# Patient Record
(demographics unavailable — no encounter records)

---

## 2017-01-05 NOTE — ER DOCUMENT REPORT
ED Flu Like





- General


Mode of Arrival: Ambulatory


Information source: Patient


TRAVEL OUTSIDE OF THE [place] IN LAST 30 DAYS: No





- HPI


Onset: Other - x3 days


Timing/Duration: Persistent


Quality of pain: Achy


Severity: None


Associated symptoms: Other - see narrative





<ELVIS YIN - Last Filed: 01/06/17 04:05>





<EVELINAJAQUAN ANN - Last Filed: 01/06/17 05:39>





- General


Chief Complaint: Headache


Stated Complaint: HEADACHE,BLURRED VISION,FEVER,COUGH


Notes: 


Patient is a 30 year old female that presents to the emergency department today 

with complaints of fevers, cough, congestion, a headache, and generalized body 

aches for three days. Patient states that she was given azithromycin for a 

"bacterial infection" but she is not sure where the infection is located. 

Patient states that most of her family has been sick and she was the "last one 

to get it". Patient states that her children all have ear infections. Patient 

dates she has been having the above mentioned symptoms for three days. (ELVIS YIN)





- Related Data


Allergies/Adverse Reactions: 


 





No Known Allergies Allergy (Verified 01/06/17 05:04)


 








Home Medications: 


 Current Home Medications





Fluoxetine HCl [Prozac 20 mg Capsule] 20 mg PO DAILY 01/06/17 [History]


Oxycodone HCl/Acetaminophen [Percocet 5-325 mg Tablet] 1 - 2 tab PO ASDIR PRN 01 /06/17 [History]











Past Medical History





- General


Information source: Patient





- Social History


Smoking Status: Current Every Day Smoker


Cigarette use (# per day): Yes


Frequency of alcohol use: None


Drug Abuse: None


Lives with: Family


Family History: Reviewed & Not Pertinent


Patient has suicidal ideation: No


Patient has homicidal ideation: No


Psychiatric Medical History: Reports: Hx Anxiety, Hx Depression


Surgical Hx: Negative





- Immunizations


Hx Diphtheria, Pertussis, Tetanus Vaccination: Yes





<ELVIS YIN - Last Filed: 01/06/17 04:05>





Review of Systems





- Review of Systems


Constitutional: See HPI, Fever


EENT: See HPI, Nose congestion


Cardiovascular: No symptoms reported


Respiratory: See HPI, Cough


Gastrointestinal: No symptoms reported


Genitourinary: No symptoms reported


Female Genitourinary: No symptoms reported


Musculoskeletal: No symptoms reported


Skin: No symptoms reported


Hematologic/Lymphatic: No symptoms reported


Neurological/Psychological: See HPI, Headaches


-: Yes All other systems reviewed and negative





<ELVIS YIN - Last Filed: 01/06/17 04:05>





Physical Exam





- Vital signs


Interpretation: Hypotensive, Febrile





<ELVIS YIN - Last Filed: 01/06/17 04:05>





<JAQUAN HOYT - Last Filed: 01/06/17 05:39>





- Vital signs


Vitals: 


 











Temp Pulse Resp BP Pulse Ox


 


 102.1 F H  89   20   105/61   96 


 


 01/05/17 20:02  01/05/17 20:02  01/05/17 20:02  01/05/17 20:02  01/05/17 20:02








 (ELVIS YIN)


 (JAQUAN HOYT)





- Notes


Notes: 


Physical Exam:





General: Alert, ill-appearing, appears uncomfortable. Febrile. 





HEENT: Normocephalic. Atraumatic. PERRL. Extraocular movements intact. 

Oropharynx clear.





Neck: No nuchal rigidity.  Complains of neck pain with head movement. 





Respiratory: No respiratory distress. Clear and equal breath sounds bilaterally.





Cardiovascular: Regular rate and rhythm. 





Abdominal: Normal Inspection. Non-tender. No distension. Normal Bowel Sounds. 





Back: Non-tender. No deformity or step off.





Extremities: Moves all four extremities.


Upper extremities: Normal inspection. Non-tender. Normal color. Normal ROM. 

Normal temperature. 


Lower extremities: Normal inspection. Non-tender. No edema. Normal color. 

Normal ROM. Normal temperature. 





Neurological: Normal cognition. AAOx4. Normal speech.  





Psychological: Normal affect. Normal Mood. 





Skin: Warm. Dry. Normal color.  (ELVIS YIN)





Course





- Laboratory


Result Diagrams: 


 01/05/17 21:00





 01/05/17 21:00





<ELVIS YIN - Last Filed: 01/06/17 04:05>





- Laboratory


Result Diagrams: 


 01/05/17 21:00





 01/05/17 21:00





- Diagnostic Test


Radiology reviewed: Reports reviewed





<JAQUAN HOYT - Last Filed: 01/06/17 05:39>





- Re-evaluation


Re-evalutation: 





01/06/17


Patient is a 30-year-old female who comes in with fever, headache, neck pain, 

body wide pain.  Labs with no acute findings.  No flu.  No strep.  No UTI.  

Chest x-ray and CT negative.  Attempted to perform lumbar puncture and got up 

to the point of anesthetizing the patient but she said that she was nauseated 

and could not tolerate the procedure.  Patient did not want to proceed.  Patient

's symptoms are very concerning for meningitis.  Dexamethasone, cefepime and 

vancomycin have been started.  Patient will be admitted to the hospital.  

Understands and agrees with this plan.  Blood cultures have been sent. (JAQUAN HOYT)





- Vital Signs


Vital signs: 


 











Temp Pulse Resp BP Pulse Ox


 


 98.6 F   89   14   90/60 L  95 


 


 01/06/17 05:32  01/05/17 20:02  01/06/17 05:31  01/06/17 05:31  01/06/17 05:31








 (ELVIS YIN)


 (JAQUAN HOYT)





- Laboratory


Laboratory results interpreted by me: 


 











  01/05/17 01/05/17 01/05/17





  21:00 21:00 21:00


 


WBC  13.0 H  


 


Seg Neutrophils %  80.6 H  


 


Lymphocytes %  7.7 L  


 


Absolute Neutrophils  10.5 H  


 


Absolute Monocytes  1.5 H  


 


Potassium   3.2 L 


 


Chloride   95 L 


 


BUN   4 L 


 


Lactic Acid    0.6 L


 


Urine Blood   














  01/05/17





  21:00


 


WBC 


 


Seg Neutrophils % 


 


Lymphocytes % 


 


Absolute Neutrophils 


 


Absolute Monocytes 


 


Potassium 


 


Chloride 


 


BUN 


 


Lactic Acid 


 


Urine Blood  SMALL H








 (ELVIS YIN)


 (JAQUAN HOYT)





Procedures





- Lumbar Puncture


  ** Lumbar puncture


Consent obtained: Yes


Lumbar puncture pre-procedure: Sterile PPE donned, Betadine prep applied, 

Chloraprep applied, Sterile drapes applied


Patient position: Sitting


Anesthetic type: 1% Lidocaine


Complications: Yes - patient did not tolerate, wanted to stop before spinal 

needle





<JAQUAN HOYT - Last Filed: 01/06/17 05:39>





Critical Care Note





- Critical Care Note


Total time excluding time spent on procedures (mins): 35 - evaluation and 

management of patient with fever and headache, multiple re-evaluations, 

coordination of admission, counseling of patient





<JAQUAN HOYT - Last Filed: 01/06/17 05:39>





Discharge





<ELVIS YIN - Last Filed: 01/06/17 04:05>





- Discharge


Admitting Provider: Hospitalist - Shad


Unit Admitted: Telemetry





<JAQUAN HOYT - Last Filed: 01/06/17 05:39>





- Discharge


Clinical Impression: 


 Meningitis





Headache


Qualifiers:


 Headache type: unspecified Headache chronicity pattern: acute headache 

Intractability: intractable Qualified Code(s): R51 - Headache





Fever


Qualifiers:


 Fever type: unspecified Qualified Code(s): R50.9 - Fever, unspecified





Condition: Stable


Disposition: ADMITTED AS INPATIENT


Scribe Attestation: 





01/06/17 05:39


I personally performed the services described in the documentation, reviewed 

and edited the documentation which was dictated to the scribe in my presence, 

and it accurately records my words and actions. (JAQUAN HOYT)





Scribe Documentation





- Scribe


Written by Scribe:: Ellen Evans, [id number] 1/6/2017


acting as scribe for :: Evelina





<ELVIS YIN - Last Filed: 01/06/17 04:05>

## 2017-01-05 NOTE — ER DOCUMENT REPORT
ED Medical Screen (RME)





- General


Chief Complaint: Headache


Stated Complaint: HEADACHE,BLURRED VISION,FEVER,COUGH


Time seen by provider: 20:51


Mode of Arrival: Ambulatory


Information source: Patient


Notes: 


31 yo female presents to ed for fever cough headache blurred vision left flank 

pain for 3 days.. Patient states she saw Dr Recinos today and he started her on 

azithromycin today.


TRAVEL OUTSIDE OF THE [place] IN LAST 30 DAYS: No





- HPI


Onset: Other - 3 days


Onset/Duration: Gradual, Worse


Quality of pain: Sharp


Severity: Moderate


Pain Level: 4


Associated Symptoms: Abdominal pain, Body/muscle aches, Cough (nonproductive), 

Dizzy/lightheaded, Fever, Headache, Other - left flank pian


Exacerbated by: Denies


Relieved by: Denies


Similar symptoms previously: Yes


Recently seen / treated by doctor: Yes





- Related Data


Smoking: Cigarettes, Less than 1 pack/day


Frequency of alcohol use: None


Drug Abuse: None


Allergies/Adverse Reactions: 


 





No Known Allergies Allergy (Verified 02/12/16 13:15)


 











Past Medical History





- Social History


Frequency of alcohol use: None


Drug Abuse: None





- Past Medical History


Cardiac Medical History: 


   Denies: Hx Coronary Artery Disease, Hx Heart Attack, Hx Hypertension


Pulmonary Medical History: 


   Denies: Hx Asthma, Hx Bronchitis, Hx COPD, Hx Pneumonia


Neurological Medical History: Denies: Hx Cerebrovascular Accident, Hx Seizures


Musculoskeltal Medical History: Denies Hx Arthritis


Psychiatric Medical History: Reports: Hx Anxiety, Hx Depression





- Immunizations


Hx Diphtheria, Pertussis, Tetanus Vaccination: Yes





Physical Exam





- Vital signs


Vitals: 


 











Temp Pulse Resp BP Pulse Ox


 


 102.1 F H  89   20   105/61   96 


 


 01/05/17 20:02  01/05/17 20:02  01/05/17 20:02  01/05/17 20:02  01/05/17 20:02














Course





- Vital Signs


Vital signs: 


 











Temp Pulse Resp BP Pulse Ox


 


 102.1 F H  89   20   105/61   96 


 


 01/05/17 20:02  01/05/17 20:02  01/05/17 20:02  01/05/17 20:02  01/05/17 20:02

## 2017-01-06 NOTE — PDOC H&P
History of Present Illness


Admission Date/PCP: 


  01/06/17 04:20





  ALEXANDREA LEWIS MD





Patient complains of: Fever headache photophobia


History of Present Illness: 


MARISELA CARLISLE is a 30 year old female with a past medical history 

ofDepression  Patient denies homicidal or suicidal ideation.  I Will evaluate 

education reconciliation, TSH and obtain urine drug screen, consider SSRI and 

or mental health consultation.  Diarrhea predominant irritable bowel syndrome 

and pelvic congestion syndrome who had been her usual state of health until 2 

days ago developing headache


Stiffness fever and photophobia.  She denies sore throat, rhinorrhea, seizure 

or dizziness or ear pain.  She admits exposure to daughter with viral syndrome.

   In the emergency room she's found to have a fever of 102 and leukocytosis 

but refuses LP.  She started on empiric antibiotics and referred to the 

hospitalist for meningitis.








Past Medical History


Cardiac Medical History: 


   Denies: Coronary Artery Disease, Myocardial Infarction, Hypertension


Pulmonary Medical History: 


   Denies: Asthma, Bronchitis, Chronic Obstructive Pulmonary Disease (COPD), 

Pneumonia


Neurological Medical History: 


   Denies: Seizures


GI Medical History: Reports: Other - Irritable bowel syndrome diarrhea 

predominant


Musculoskeltal Medical History: 


   Denies: Arthritis


Psychiatric Medical History: Reports: Depression, Tobacco Dependency, Other - 

Chronic pain of pelvic congestion syndrome


Hematology: 


   Denies: Anemia





Social History


Information Source: Patient


Lives with: Family


Smoking Status: Current Every Day Smoker


Cigarettes Packs Per Day: 1


Number of Years Smoking: 10


Frequency of Alcohol Use: None


Hx Recreational Drug Use: No


Hx Prescription Drug Abuse: No





- Advance Directive


Resuscitation Status: Full Code





Family History


Family History: DM


Parental Family History Reviewed: Yes


Children Family History Reviewed: Yes


Sibling(s) Family History Reviewed.: Yes





Medication/Allergy


Home Medications: 








Fluoxetine HCl [Prozac 20 mg Capsule] 20 mg PO DAILY 01/06/17 


Oxycodone HCl/Acetaminophen [Percocet 5-325 mg Tablet] 1 - 2 tab PO ASDIR PRN 01 /06/17 








Allergies/Adverse Reactions: 


 





No Known Allergies Allergy (Verified 01/06/17 05:04)


 











Review of Systems


Constitutional: ABSENT: chills, fever(s), headache(s), weight gain, weight loss


Eyes: ABSENT: visual disturbances


Ears: ABSENT: hearing changes


Cardiovascular: ABSENT: chest pain, dyspnea on exertion, edema, orthropnea, 

palpitations


Respiratory: ABSENT: cough, hemoptysis


Gastrointestinal: ABSENT: abdominal pain, constipation, diarrhea, hematemesis, 

hematochezia, nausea, vomiting


Genitourinary: ABSENT: dysuria, hematuria


Musculoskeletal: ABSENT: joint swelling


Integumentary: ABSENT: rash, wounds


Neurological: ABSENT: abnormal gait, abnormal speech, confusion, dizziness, 

focal weakness, syncope


Psychiatric: ABSENT: anxiety, depression, homidical ideation, suicidal ideation


Endocrine: ABSENT: cold intolerance, heat intolerance, polydipsia, polyuria


Hematologic/Lymphatic: ABSENT: easy bleeding, easy bruising





Physical Exam


Vital Signs: 


 











Temp Pulse Resp BP Pulse Ox


 


 98.9 F   55 L  16   90/46 L  97 


 


 01/06/17 06:06  01/06/17 06:06  01/06/17 06:06  01/06/17 06:06  01/06/17 06:06








 Intake & Output











 01/04/17 01/05/17 01/06/17





 11:59 11:59 11:59


 


Weight   48.9 kg











General appearance: PRESENT: no acute distress, well-developed, well-nourished


Head exam: PRESENT: atraumatic, normocephalic


Eye exam: PRESENT: conjunctiva pink, EOMI, PERRLA.  ABSENT: scleral icterus


Ear exam: PRESENT: normal external ear exam


Mouth exam: PRESENT: moist, tongue midline


Throat exam: ABSENT: post pharyngeal erythema, tonsillar erythema, tonsillar 

exudate, tonsillogmegaly


Neck exam: PRESENT: full ROM, meningismus.  ABSENT: carotid bruit, JVD, 

lymphadenopathy, thyromegaly


Respiratory exam: PRESENT: clear to auscultation jeanine.  ABSENT: rales, rhonchi, 

wheezes


Cardiovascular exam: PRESENT: RRR.  ABSENT: diastolic murmur, rubs, systolic 

murmur


Pulses: PRESENT: normal dorsalis pedis pul


Vascular exam: PRESENT: normal capillary refill


GI/Abdominal exam: PRESENT: normal bowel sounds, soft.  ABSENT: distended, 

guarding, mass, organolmegaly, rebound, tenderness


Rectal exam: PRESENT: deferred


Extremities exam: PRESENT: full ROM.  ABSENT: calf tenderness, clubbing, pedal 

edema


Neurological exam: PRESENT: alert, awake, oriented to person, oriented to place

, oriented to time, oriented to situation, CN II-XII grossly intact.  ABSENT: 

motor sensory deficit


Psychiatric exam: PRESENT: appropriate affect, normal mood.  ABSENT: homicidal 

ideation, suicidal ideation


Skin exam: PRESENT: dry, intact, warm.  ABSENT: cyanosis, rash





Results


Impressions: 


 





Chest X-Ray  01/05/17 20:50


IMPRESSION:  REACTIVE AIRWAY DISEASE VERSUS VIRAL SYNDROME.  NO CONSOLIDATION.


 








Head CT  01/06/17 01:32


IMPRESSION:  NORMAL BRAIN CT WITHOUT CONTRAST.


 














Assessment & Plan





- Diagnosis


(1) Meningitis


Is this a current diagnosis for this admission?: YesPlan: 


Symptoms suggestive of aseptic meningitis she started on Rocephin vancomycin 

and acyclovir continue symptomatic management.  Patient refusing LP continue to 

monitor biophysical and biochemical response








(2) Depression





Is this a current diagnosis for this admission?: YesPlan: 


Patient has flat affect unclear if underlying depression versus acute illness 

continue outpatient SSRI








(3) Chronic pain





Is this a current diagnosis for this admission?: YesPlan: 


Unclear indication for narcotics given history of pelvic congestion syndrome 

will hold narcotics








(4) Tobacco dependence


Is this a current diagnosis for this admission?: YesPlan: 


Tobacco Dependence  patient received tobacco cessation counseling and offered 

nicotine replacement options








(5) Fever


Qualifiers: 


     Fever type: unspecified     Qualified Code(s): R50.9 - Fever, unspecified  


Is this a current diagnosis for this admission?: YesPlan: 


Please see #1








(6) Headache


Qualifiers: 


     Headache type: unspecified     Headache chronicity pattern: acute headache

     Intractability: intractable     Qualified Code(s): R51 - Headache  


Is this a current diagnosis for this admission?: YesPlan: 


Please see #1











- Time


Time Spent: 30 to 50 Minutes

## 2017-01-07 NOTE — PDOC DISCHARGE SUMMARY
General





- Admit/Disc Date/PCP


Admission Date/Primary Care Provider: 


  01/06/17 04:20





  ALEXANDREA LEWIS MD





Discharge Date: 01/07/17





- Discharge Diagnosis


(1) Influenza B


Is this a current diagnosis for this admission?: YesSummary: 


Started on Tamiflu, ibuprofen and prn zofran. She was instructed to use Mucinex 

or Robitussin for cough over the counter








(2) Chronic pain


Is this a current diagnosis for this admission?: YesSummary: 


Continue home pain medication








(3) Depression


Is this a current diagnosis for this admission?: YesSummary: 


continue SSRI








(4) Tobacco dependence


Is this a current diagnosis for this admission?: YesSummary: 


Counseled








(5) Headache


Is this a current diagnosis for this admission?: YesSummary: 


Ibuprofen prn











- Additional Information


Resuscitation Status: Full Code


Discharge Activity: Activity As Tolerated, Balance Activity w/Rest


Home Medications: 








Fluoxetine HCl [Prozac 20 mg Capsule] 20 mg PO DAILY 01/06/17 


Oxycodone HCl/Acetaminophen [Percocet 5-325 mg Tablet] 1 tab PO Q8 01/06/17 


Acetaminophen [Tylenol 325 mg Tablet] 650 mg PO Q4HP PRN  tablet 01/07/17 


Ibuprofen 800 mg PO Q8HP PRN #30 tablet 01/07/17 


Ondansetron HCl [Zofran 4 mg Tablet] 1 - 2 tab PO Q4H PRN #10 tablet 01/07/17 


Oseltamivir Phosphate [Tamiflu 75 mg Capsule] 75 mg PO BID #9 capsule 01/07/17 











History of Present Illness


Patient complains of: Headache, body aches, fever and cough


History of Present Illness: 


MARISELA CARLISLE is a 30 year old female who presented to [facility] ED's 

on 1/5/2016 with fever, cough and headache. She had a rapid flu in the ED that 

was read as negative. She has mild leucocytosis. She refused lumbar puncture by 

ED MD. She was empirically started on IV Ceftriazone, Vancomycin and Acyclovir 

for possible meningitis and referred to the hospitalist for admission. 








Hospital Course


Hospital Course: 


Patient was admitted to the telemetry unit. Fever improved, she continued to 

complain about headache, body ache and cough. We repeated her influenza test 

using a nasal swab and found the patient to be positive for influenza B. 

Antibiotics were stopped. The patient was given one dose of tamiflu and 

discharged home.





Physical Exam


Vital Signs: 


 











Temp Pulse Resp BP Pulse Ox


 


 98.0 F   49 L  16   95/51 L  100 


 


 01/07/17 08:29  01/07/17 08:29  01/07/17 08:29  01/07/17 08:29  01/07/17 08:29








 Intake & Output











 01/06/17 01/07/17 01/08/17





 06:59 06:59 06:59


 


Intake Total  780 


 


Balance  780 


 


Weight 48.9 kg 48.9 kg 











General appearance: PRESENT: no acute distress, thin, well-developed


Head exam: PRESENT: atraumatic, normocephalic


Eye exam: PRESENT: conjunctiva pink, EOMI, PERRLA.  ABSENT: scleral icterus


Ear exam: PRESENT: normal external ear exam


Mouth exam: PRESENT: moist, tongue midline


Neck exam: ABSENT: carotid bruit, JVD, lymphadenopathy, thyromegaly


Respiratory exam: PRESENT: clear to auscultation jeanine.  ABSENT: rales, rhonchi, 

wheezes


Cardiovascular exam: PRESENT: RRR.  ABSENT: diastolic murmur, rubs, systolic 

murmur


Pulses: ABSENT: normal carotid pulses, normal radial pulses, normal femoral 

pulses, normal dorsalis pedis pul, +1 pedal pulses bilateral, +2 pedal pulses 

bilateral, other


Vascular exam: PRESENT: normal capillary refill


GI/Abdominal exam: PRESENT: normal bowel sounds, soft.  ABSENT: distended, 

guarding, mass, organolmegaly, rebound, tenderness


Rectal exam: PRESENT: deferred


Extremities exam: PRESENT: full ROM.  ABSENT: calf tenderness, clubbing, pedal 

edema


Neurological exam: PRESENT: alert, awake, oriented to person, oriented to place

, oriented to time, oriented to situation, CN II-XII grossly intact.  ABSENT: 

motor sensory deficit


Psychiatric exam: PRESENT: appropriate affect, normal mood.  ABSENT: homicidal 

ideation, suicidal ideation


Skin exam: PRESENT: dry, intact, warm.  ABSENT: cyanosis, rash





Results


Laboratory Results: 


 





 01/07/17 05:38 





 01/07/17 05:38 





 











  01/07/17 01/07/17





  05:38 05:38


 


WBC  14.3 H 


 


RBC  3.97 


 


Hgb  11.9 L 


 


Hct  36.3 


 


MCV  92 


 


MCH  30.0 


 


MCHC  32.8 


 


RDW  14.0 


 


Plt Count  181 


 


Seg Neutrophils %  86.8 H 


 


Lymphocytes %  6.2 L 


 


Monocytes %  6.9 


 


Eosinophils %  0.0 


 


Basophils %  0.1 


 


Absolute Neutrophils  12.4 H 


 


Absolute Lymphocytes  0.9 


 


Absolute Monocytes  1.0 


 


Absolute Eosinophils  0.0 


 


Absolute Basophils  0.0 


 


Sodium   143.4


 


Potassium   3.7


 


Chloride   110 H


 


Carbon Dioxide   22


 


Anion Gap   11


 


BUN   4 L


 


Creatinine   0.45 L


 


Est GFR ( Amer)   > 60


 


Est GFR (Non-Af Amer)   > 60


 


Glucose   100


 


Calcium   8.4











Impressions: 


 





Chest X-Ray  01/05/17 20:50


IMPRESSION:  REACTIVE AIRWAY DISEASE VERSUS VIRAL SYNDROME.  NO CONSOLIDATION.


 








Head CT  01/06/17 01:32


IMPRESSION:  NORMAL BRAIN CT WITHOUT CONTRAST.


 














Qualifiers


**PATEINT BEING DISCHARGED WITH ANY OF THE FOLLOWING DIAGNOSIS?: No





Plan


Discharge Plan: 


Home with family. Prescriptions for Tamiflu. Zofran, and Zofran


Time Spent: Less than 30 Minutes

## 2017-01-27 NOTE — ER DOCUMENT REPORT
ED GI/





- General


Chief Complaint: Diarrhea


Stated Complaint: POSSIBLE MEDICATION WITHDRAWL


Mode of Arrival: Ambulatory


Information source: Patient


Notes: 


Patient presents reporting insomnia, nausea and diarrhea.  Patient suspects 

that she is likely withdrawing from opioids.  Patient states she has taken 10 

mg of oxycodone every 6 hours for the past 2 years.  Patient has not had any 

oxycodone since 5 PM yesterday.  Patient states she does want to come off of 

her medications, but is not able to handle the side effects.  Patient does 

complain of abdominal cramping.  Patient denies any urinary symptoms or fever.


TRAVEL OUTSIDE OF THE [place] IN LAST 30 DAYS: No





- HPI


Patient complains to provider of: Abdominal pain, Diarrhea.  No: Vomiting


Onset: Yesterday


Timing/Duration: Waxing and waning


Quality of pain: Cramping


Severity at maximum: Severe


Pain Level: 0


Vaginal bleeding (Compared to normal period): None


Associated symptoms: Diarrhea, Nausea.  denies: Blood in stool, Dysuria, Fever, 

Urinary hesitancy, Urinary frequency, Urinary retention, Urinary urgency, 

Vaginal discharge, Vomiting


Exacerbated by: Denies


Relieved by: Denies


Similar symptoms previously: Yes


Recently seen / treated by doctor: No





- Related Data


Allergies/Adverse Reactions: 


 





No Known Allergies Allergy (Verified 01/27/17 07:29)


 











Past Medical History





- General


Information source: Patient


Last Menstrual Period: 01/10/2017





- Social History


Smoking Status: Current Every Day Smoker


Chew tobacco use (# tins/day): No


Frequency of alcohol use: None


Drug Abuse: None


Occupation: none


Family History: DM


Patient has suicidal ideation: No


Patient has homicidal ideation: No





- Past Medical History


Cardiac Medical History: 


   Denies: Hx Coronary Artery Disease, Hx Heart Attack, Hx Hypertension


Pulmonary Medical History: 


   Denies: Hx Asthma, Hx Bronchitis, Hx COPD, Hx Pneumonia


Neurological Medical History: Denies: Hx Cerebrovascular Accident, Hx Seizures


Renal/ Medical History: Denies: Hx Peritoneal Dialysis


Musculoskeltal Medical History: Denies Hx Arthritis


Psychiatric Medical History: Reports: Hx Anxiety, Hx Depression


Past Surgical History: Reports: Hx Gynecologic Surgery





- Immunizations


Hx Diphtheria, Pertussis, Tetanus Vaccination: Yes





Review of Systems





- Review of Systems


Constitutional: No symptoms reported.  denies: Fever


EENT: No symptoms reported


Cardiovascular: No symptoms reported.  denies: Chest pain


Respiratory: No symptoms reported.  denies: Cough, Short of breath


Gastrointestinal: Abdominal pain, Diarrhea, Nausea, Poor fluid intake.  denies: 

Vomiting


Genitourinary: No symptoms reported.  denies: Dysuria, Flank pain


Female Genitourinary: No symptoms reported


Musculoskeletal: No symptoms reported


Skin: No symptoms reported


Hematologic/Lymphatic: No symptoms reported


Neurological/Psychological: No symptoms reported





Physical Exam





- Vital signs


Vitals: 


 











Temp Pulse Resp BP Pulse Ox


 


 97.3 F   86   16   126/88 H  100 


 


 01/27/17 07:30  01/27/17 07:30  01/27/17 07:30  01/27/17 07:30  01/27/17 07:30














- General


General appearance: Appears well, Alert


In distress: None





- HEENT


Head: Normocephalic, Atraumatic


Eyes: Normal


Nasal: Normal


Mouth/Lips: Normal


Mucous membranes: Normal


Neck: Normal





- Respiratory


Respiratory status: No respiratory distress


Chest status: Nontender


Breath sounds: Normal.  No: Rales, Rhonchi, Stridor, Wheezing


Chest palpation: Normal





- Cardiovascular


Rhythm: Regular


Heart sounds: S1 appreciated, S2 appreciated


Murmur: No





- Abdominal


Inspection: Normal


Distension: No distension


Bowel sounds: Normal


Tenderness: Nontender


Organomegaly: No organomegaly





- Back


Back: Normal, Nontender.  No: CVA tenderness





- Extremities


General upper extremity: Normal inspection, Normal strength


General lower extremity: Normal inspection, Normal strength





- Neurological


Neuro grossly intact: Yes


Cognition: Normal


[place] Coma Scale Eye Opening: Spontaneous


[place] Coma Scale Verbal: Oriented


[place] Coma Scale Motor: Obeys Commands


[place] Coma Scale Total: 15





- Psychological


Associated symptoms: Tearful





- Skin


Skin Temperature: Warm


Skin Moisture: Dry


Skin Color: Normal





Course





- Re-evaluation


Re-evalutation: 


01/27/17 08:12


Consulted with Dr. Patel regarding patient presentation and diagnostic 

evaluation.





01/27/17 


Patient tolerating oral fluids without vomiting





01/27/17 


Patient given outpatient referral information for detox.  Patient states she 

plans to go to port directly after discharge





- Vital Signs


Vital signs: 


 











Temp Pulse Resp BP Pulse Ox


 


 97.3 F   84   16   124/76   99 


 


 01/27/17 07:30  01/27/17 10:34  01/27/17 10:34  01/27/17 10:34  01/27/17 10:34














- Laboratory


Result Diagrams: 


 01/27/17 08:05





 01/27/17 08:05


Laboratory results interpreted by me: 


 











  01/27/17 01/27/17





  08:05 09:15


 


Potassium  3.4 L 


 


BUN  5 L 


 


Glucose  115 H 


 


Urine Protein   30 H











 Labs- Entire Visit











  01/27/17 01/27/17 01/27/17





  08:05 08:05 08:05


 


WBC  8.8  


 


RBC  4.87  


 


Hgb  14.4  


 


Hct  44.7  


 


MCV  92  


 


MCH  29.6  


 


MCHC  32.3  


 


RDW  13.9  


 


Plt Count  356  


 


Seg Neutrophils %  76.1  


 


Lymphocytes %  16.2  


 


Monocytes %  6.3  


 


Eosinophils %  0.9  


 


Basophils %  0.5  


 


Absolute Neutrophils  6.7  


 


Absolute Lymphocytes  1.4  


 


Absolute Monocytes  0.6  


 


Absolute Eosinophils  0.1  


 


Absolute Basophils  0.0  


 


Sodium   143.8 


 


Potassium   3.4 L 


 


Chloride   104 


 


Carbon Dioxide   27 


 


Anion Gap   13 


 


BUN   5 L 


 


Creatinine   0.59 


 


Est GFR ( Amer)   > 60 


 


Est GFR (Non-Af Amer)   > 60 


 


Glucose   115 H 


 


Calcium   9.4 


 


Total Bilirubin   0.6 


 


Direct Bilirubin   0.0 


 


AST   19 


 


ALT   27 


 


Alkaline Phosphatase   77 


 


Total Protein   6.8 


 


Albumin   4.2 


 


Lipase   159.8 


 


Serum HCG, Qual    NEGATIVE


 


Urine Color   


 


Urine Appearance   


 


Urine pH   


 


Ur Specific Gravity   


 


Urine Protein   


 


Urine Glucose (UA)   


 


Urine Ketones   


 


Urine Blood   


 


Urine Nitrite   


 


Urine Bilirubin   


 


Urine Urobilinogen   


 


Ur Leukocyte Esterase   


 


Urine WBC (Auto)   


 


Squamous Epi Cells Auto   


 


Urine Mucus (Auto)   


 


Urine Ascorbic Acid   














  01/27/17





  09:15


 


WBC 


 


RBC 


 


Hgb 


 


Hct 


 


MCV 


 


MCH 


 


MCHC 


 


RDW 


 


Plt Count 


 


Seg Neutrophils % 


 


Lymphocytes % 


 


Monocytes % 


 


Eosinophils % 


 


Basophils % 


 


Absolute Neutrophils 


 


Absolute Lymphocytes 


 


Absolute Monocytes 


 


Absolute Eosinophils 


 


Absolute Basophils 


 


Sodium 


 


Potassium 


 


Chloride 


 


Carbon Dioxide 


 


Anion Gap 


 


BUN 


 


Creatinine 


 


Est GFR ( Amer) 


 


Est GFR (Non-Af Amer) 


 


Glucose 


 


Calcium 


 


Total Bilirubin 


 


Direct Bilirubin 


 


AST 


 


ALT 


 


Alkaline Phosphatase 


 


Total Protein 


 


Albumin 


 


Lipase 


 


Serum HCG, Qual 


 


Urine Color  YELLOW


 


Urine Appearance  SLIGHTLY-CLOUDY


 


Urine pH  5.0


 


Ur Specific Gravity  1.028


 


Urine Protein  30 H


 


Urine Glucose (UA)  NEGATIVE


 


Urine Ketones  NEGATIVE


 


Urine Blood  NEGATIVE


 


Urine Nitrite  NEGATIVE


 


Urine Bilirubin  NEGATIVE


 


Urine Urobilinogen  NEGATIVE


 


Ur Leukocyte Esterase  NEGATIVE


 


Urine WBC (Auto)  1


 


Squamous Epi Cells Auto  2


 


Urine Mucus (Auto)  MOD


 


Urine Ascorbic Acid  NEGATIVE














Discharge





- Discharge


Clinical Impression: 


 Opiate withdrawal, Nausea, Hypokalemia





Diarrhea


Qualifiers:


 Diarrhea type: unspecified type Qualified Code(s): R19.7 - Diarrhea, 

unspecified





Condition: Stable


Disposition: HOME, SELF-CARE


Instructions:  Diarrhea, Nonspecific ([facility]), Nausea or Vomiting, Nonspecific ([facility]

), Antinausea Medication ([facility]), Hypokalemia ([facility])


Additional Instructions: 


Return immediately for any new or worsening symptoms





Followup with your primary care provider, call tomorrow to make a followup 

appointment.  Your primary doctor can recheck your potassium level as it was a 

little low today.





Follow up with an outpatient detox facility, review list that you were given at 

discharge








Prescriptions: 


Ondansetron HCl [Zofran 4 mg Tablet] 1 - 2 tab PO Q6 PRN #15 tablet


 PRN Reason: 


Referrals: 


ALEXANDREA LEWIS MD [Primary Care Provider] - Follow up as needed


[facility] [Provider Group] - Follow up as needed


[facility] [Provider Group] - Follow up as needed

## 2017-02-08 NOTE — ER DOCUMENT REPORT
ED Dizziness/Weakness





- General


Mode of Arrival: Ambulatory


Information source: Patient


TRAVEL OUTSIDE OF THE [place] IN LAST 30 DAYS: No





- HPI


Patient complains to provider of: Dizziness


Onset: This morning


Onset/Duration: Gradual, Persistent


Associated symptoms: Dizzy, Headache, Lightheaded


Exacerbated by: Change in position - Standing


Baseline gait: Walks w/o assistance





<SHAQUILLE KENT - Last Filed: 02/09/17 00:32>





<CORRINA ANDINO - Last Filed: 02/20/17 13:06>





- General


Chief Complaint: Dizziness


Stated Complaint: DIZZY/HEART RATE PROBLEM


Notes: 


Patient is a 30-year-old female presenting to the emergency department 

accompanied by her  concerned of lightheadedness upon standing onset 

today.  Patient states that she has been very hot, diaphoretic, and shaky with 

head pounding.  Patient states that she got out of a prescription drug detox 

program on Monday,  02/06/2017.  Patient was there for 10 days.  Patient denies 

ever buying drugs off the street, but instead admits that she was prescribed 60-

80 mg Oxycodone every day for the past year from Dr. Lewis.  Patient states that 

she became aware that she was becoming addicted, but whenever she tried to quit 

she began having withdrawal symptoms.  Patient was given the choice by her 

 to either go to treatment or get , so she decided to save her 

family.  Patient denies ever having any seizures related to withdrawal.


 (SHAQUILLE KENT)





- Related Data


Allergies/Adverse Reactions: 


 





No Known Allergies Allergy (Verified 02/08/17 18:02)


 











Past Medical History





- General


Information source: Patient





- Social History


Smoking Status: Current Every Day Smoker


Chew tobacco use (# tins/day): No


Frequency of alcohol use: None


Drug Abuse: Prescription drugs


Family History: Reviewed & Not Pertinent, DM


Patient has suicidal ideation: No


Patient has homicidal ideation: No





- Past Medical History


Cardiac Medical History: 


   Denies: Hx Coronary Artery Disease, Hx Heart Attack, Hx Hypertension


Pulmonary Medical History: 


   Denies: Hx Asthma, Hx Bronchitis, Hx COPD, Hx Pneumonia


Neurological Medical History: Denies: Hx Cerebrovascular Accident, Hx Seizures


Renal/ Medical History: Reports: Hx Ectopic Pregnancy, Other - "Pelvic 

congestion syndrome".  Denies: Hx Peritoneal Dialysis


Musculoskeltal Medical History: Denies Hx Arthritis


Psychiatric Medical History: Reports: Hx Anxiety, Hx Depression


Past Surgical History: Reports: Hx Gynecologic Surgery





- Immunizations


Hx Diphtheria, Pertussis, Tetanus Vaccination: Yes





<SHAQUILLE KENT - Last Filed: 02/09/17 00:32>





Review of Systems





- Review of Systems


Constitutional: See HPI, Diaphoresis, Other - Shaky


EENT: No symptoms reported


Cardiovascular: See HPI, Dizziness, Lightheaded


Respiratory: No symptoms reported


Gastrointestinal: No symptoms reported.  denies: Nausea


Genitourinary: No symptoms reported


Female Genitourinary: No symptoms reported


Musculoskeletal: No symptoms reported


Skin: No symptoms reported


Hematologic/Lymphatic: No symptoms reported


Neurological/Psychological: No symptoms reported


-: Yes All other systems reviewed and negative





<SHAQUILLE KENT - Last Filed: 02/09/17 00:32>





Physical Exam





- Vital signs


Interpretation: Normal





- General


General appearance: Appears well, Alert





- HEENT


Head: Normocephalic, Atraumatic


Eyes: Normal


Pupils: PERRL





- Respiratory


Respiratory status: No respiratory distress


Chest status: Nontender


Breath sounds: Normal


Chest palpation: Normal





- Cardiovascular


Rhythm: Regular


Heart sounds: Normal auscultation


Murmur: No





- Abdominal


Inspection: Normal


Distension: No distension


Bowel sounds: Normal


Tenderness: Nontender


Organomegaly: No organomegaly





- Back


Back: Normal, Nontender





- Extremities


General upper extremity: Normal inspection, Nontender, Normal color, Normal ROM

, Normal temperature


General lower extremity: Normal inspection, Nontender, Normal color, Normal ROM

, Normal temperature





- Neurological


Neuro grossly intact: Yes


Cognition: Normal


[place] Coma Scale Eye Opening: Spontaneous


[place] Coma Scale Verbal: Oriented


[place] Coma Scale Motor: Obeys Commands


[place] Coma Scale Total: 15


Speech: Normal





- Psychological


Associated symptoms: Normal affect, Normal mood





- Skin


Skin Temperature: Warm


Skin Moisture: Dry


Skin Color: Normal





<SHAQUILLE KENT - Last Filed: 02/09/17 00:32>





Course





- Laboratory


Result Diagrams: 


 02/08/17 18:10





 02/08/17 18:10





<SHAQUILLE KENT - Last Filed: 02/09/17 00:32>





- Laboratory


Result Diagrams: 


 02/08/17 18:10





 02/08/17 18:10





<CORRINA ANDINO - Last Filed: 02/20/17 13:06>





- Re-evaluation


Re-evalutation: 





02/08/17 23:55


I personally performed the services described in the documentation, reviewed 

and edited the documentation which was dictated to my scribe in my presence, 

and it accurately records my words and actions.





Patient presents a chief plain dizziness room spinning sensation no headache 

blurred vision or double vision. Patient just got out of detox home since 

Monday for oxycodone addiction. Says he doing pretty well with medications they 

gave her there is not currently on any medications. Is not altered mental 

status not tachycardic or hypertensive. Acute laboratory evaluation is negative 

vital signs are stable tried to give her a little Antivert with the dizziness 

she feels some improvement she is able to walk and he really without any 

difficulty clinical concerns for central cause of dizziness. Patient stable 

wants to go home follow primary care physician one to 2 days and discussed 

reasons for ED return sooner (CORRINA ANDINO)





- Vital Signs


Vital signs: 


 











Temp Pulse Resp BP Pulse Ox


 


 98.1 F   66   18   111/78   99 


 


 02/09/17 00:27  02/09/17 00:27  02/09/17 00:27  02/09/17 00:27  02/09/17 00:27








 (SHAQUILLE KENT)


 (CORRINA ANDINO)





- Laboratory


Laboratory results interpreted by me: 


 











  02/08/17 02/08/17





  18:10 18:10


 


WBC  11.6 H 


 


RDW  14.4 H 


 


Carbon Dioxide   32 H


 


ALT   61 H








 (SHAQUILLE KENT)


 (CORRINA ANDINO)





Discharge





<SHAQUILLE KENT - Last Filed: 02/09/17 00:32>





<CORRINA ANDINO - Last Filed: 02/20/17 13:06>





- Discharge


Clinical Impression: 


 Dizziness





Condition: Stable


Disposition: HOME, SELF-CARE


Instructions:  Dizziness ([facility])


Additional Instructions: 


Dizziness





Under normal circumstances, your sense of balance is controlled by a number of 

signals that your brain receives from several locations:


   Eyes. No matter what your position, visual signals help you determine where 

your body is in space and how it's moving.


   Sensory nerves. These are in your skin, muscles and joints. Sensory nerves 

send messages to your brain about body movements and positions.


   Inner ear. The organ of balance in your inner ear is the vestibular 

labyrinth. It includes loop-shaped structures (semicircular canals) that 

contain fluid and fine, hair-like sensors that monitor the rotation of your 

head. Near the semicircular canals are the utricle and saccule, which contain 

tiny particles called otoconia (o-[id number]-[id number]). These particles are attached 

to sensors that help detect gravity and back-and-forth motion.


Good balance depends on at least two of these three sensory systems working 

well. For instance, closing your eyes while washing your hair in the shower 

doesn't mean you'll lose your balance. Signals from your inner ear and sensory 

nerves help keep you upright.


However, if your central nervous system can't process signals from all of these 

locations, if the messages are contradictory, or if the sensory systems aren't 

functioning properly, you may experience loss of balance.


Dizziness may have a number of potential causes. These may include:


Vertigo





Vertigo - the false sense of motion or spinning - is the most common symptom of 

dizziness. Sitting up or moving around may make it worse. Sometimes vertigo is 

severe enough to cause nausea and vomiting.


Vertigo usually results from a problem with the nerves and the structures of 

the balance mechanism in your inner ear (vestibular system), which sense 

movement and changes in your head position. Abnormal rhythmic eye movements (

nystagmus) almost always accompany vertigo. Causes of vertigo may include:


   Benign paroxysmal positional vertigo (BPPV). BPPV involves intense, brief 

episodes of vertigo associated with a change in the position of your head, 

often when you turn over in bed or sit up in the morning. It occurs when normal 

calcium carbonate crystals (otoconia) break loose and fall into the wrong part 

of the canals in your inner ear. When these particles shift, they stimulate 

sensors in your ear, producing an episode of vertigo. Doctors don't know what 

causes BPPV, but it may be a natural result of aging. Trauma to your head also 

may lead to BPPV.


   Inflammation in the inner ear. Signs and symptoms of inflammation of the 

inner ear (acute vestibular neuronitis or labyrinthitis) include sudden, 

intense vertigo that may persist for several days, with nausea and vomiting. It 

can be incapacitating, requiring bed rest to minimize the signs and symptoms. 

Fortunately, vestibular neuronitis generally subsides and clears up on its own. 

Recovery time may be shorter with vestibular rehabilitation exercises. Although 

the cause of this condition is unknown, it may be a viral infection.


   Meniere's disease. This disease involves the excessive buildup of fluid in 

your inner ear. It may affect adults at any age and is characterized by sudden 

episodes of vertigo lasting 30 minutes to an hour or longer. Other signs and 

symptoms include the feeling of fullness in your ear, buzzing or ringing in 

your ear (tinnitus), and fluctuating hearing loss. The cause of Meniere's 

disease is unknown.


   Vestibular migraine. People who experience a vestibular migraine are very 

sensitive to motion. Dizziness and vertigo caused by a vestibular migraine may 

be triggered by turning your head quickly, being in a crowded or confusing place

, driving or riding in a vehicle, or even watching movement on TV. A vestibular 

migraine may cause feelings of imbalance or unsteadiness, hearing loss, "muffled

" hearing, or ringing in your ears (tinnitus). For most people with a 

vestibular migraine, vertigo doesn't necessarily happen at the same time as the 

headache. Instead, typical migraine triggers may lead to vertigo without an 

actual migraine. Attacks of migrainous vertigo can last from a few minutes to 

several days.


   Acoustic neuroma. An acoustic neuroma (schwannoma) is a noncancerous (benign

) growth on the acoustic nerve, which connects the inner ear to your brain. 

Signs and symptoms of an acoustic neuroma may include dizziness, loss of balance

, hearing loss and tinnitus.


   Rapid changes in motion. Riding on roller coasters or in boats, cars or 

even airplanes may on occasion make you dizzy.


   Other causes. Rarely, vertigo can be a symptom of a more serious 

neurological problem such as a stroke, brain hemorrhage or multiple sclerosis.


Feeling of faintness (presyncope)





"Presyncope" is the medical term for feeling faint and lightheaded without 

losing consciousness. Sometimes nausea, pale skin and a sense of dizziness 

accompany a feeling of faintness. Causes of presyncope include:


   Drop in blood pressure (orthostatic hypotension). A dramatic drop in your 

systolic blood pressure - the higher number in your blood pressure reading - 

may result in lightheadedness or a feeling of faintness. It can occur after 

sitting up or standing too quickly.


   Inadequate output of blood from the heart. Conditions such as partially 

blocked arteries (atherosclerosis), disease of the heart muscle (cardiomyopathy)

, abnormal heart rhythm (arrhythmia) or a decrease in blood volume may cause 

inadequate blood flow from your heart.


Loss of balance (disequilibrium)


Disequilibrium is the loss of balance or the feeling of unsteadiness when you 

walk. Causes may include:


   Inner ear (vestibular) problems. Abnormalities with your inner ear can 

cause you to feel like you are floating, have a heavy head or are unsteady in 

the dark.


   Sensory disorders. Failing vision and nerve damage in your legs (peripheral 

neuropathy) are common in older adultsand may result in difficulty maintaining 

your balance.


   Joint and muscle problems. Muscle weakness and osteoarthritis - the type of 

arthritis that involves wear and tear of your joints - can contribute to loss 

of balance when it involves your weight-bearing joints.


   Medications. Loss of balance can be a side effect of certain medications, 

such as anti-seizure drugs, sedatives and tranquilizers.


Lightheadedness and other kinds of 'dizziness'


Feeling lightheaded is the feeling of being "spaced out" or having the 

sensation of spinning inside your head. It can also give you the sensation that 

if your lightheadedness worsens, you might lose consciousness. Causes may 

include:


   Inner ear disorders. These abnormalities of your inner ear can lead to 

illusions of motion and make you feel like you're floating.


   Anxiety disorders. Certain anxiety disorders, such as panic attacks and a 

fear of leaving home or being in large, open spaces (agoraphobia), may cause 

lightheadedness.


   Hyperventilation. Abnormally rapid breathing that often accompanies anxiety 

disorders may make you feel lightheaded.


Referrals: 


ALEXANDREA LEWIS MD [Primary Care Provider] - Follow up as needed


[facility] [Provider Group] - Follow up in 3-5 days (Follow 

primary care physician one to 2 days return for increasing worsening or new 

symptoms)





Scribe Documentation





- Scribe


Written by Ellen:: Shaquille Kent 02/08/2017 [id number]


acting as scribe for :: Ottoniel





<SHAQUILLE KENT - Last Filed: 02/09/17 00:32>

## 2017-02-08 NOTE — ER DOCUMENT REPORT
ED Medical Screen (RME)





- General


Stated Complaint: DIZZY/HEART RATE PROBLEM


Notes: 


31 yo female c/o head spinning, heart beating fast and hot flashes.  pt feels 

these symptoms when she stands up.  pt was released from detox program on Monday

, detoxed from oxycodone.  .  pt was taking gabapentin, zanaflex and vistaril 

QID x 10 days, elavil the last 3 days of treatment.  hasnt had any meds since 

monday


no n/v/d, no fever, no recent illness


TRAVEL OUTSIDE OF THE [place] IN LAST 30 DAYS: No





- Related Data


Allergies/Adverse Reactions: 


 





No Known Allergies Allergy (Verified 01/27/17 07:29)


 











Past Medical History





- Past Medical History


Cardiac Medical History: 


   Denies: Hx Coronary Artery Disease, Hx Heart Attack, Hx Hypertension


Pulmonary Medical History: 


   Denies: Hx Asthma, Hx Bronchitis, Hx COPD, Hx Pneumonia


Neurological Medical History: Denies: Hx Cerebrovascular Accident, Hx Seizures


Renal/ Medical History: Denies: Hx Peritoneal Dialysis


Musculoskeltal Medical History: Denies Hx Arthritis


Psychiatric Medical History: Reports: Hx Anxiety, Hx Depression


Past Surgical History: Reports: Hx Gynecologic Surgery





- Immunizations


Hx Diphtheria, Pertussis, Tetanus Vaccination: Yes





Physical Exam





- Vital signs


Vitals: 





 











Temp Pulse Resp BP Pulse Ox


 


 97.9 F   79   16   109/69   100 


 


 02/08/17 17:44  02/08/17 17:44  02/08/17 17:44  02/08/17 17:44  02/08/17 17:44














Course





- Vital Signs


Vital signs: 





 











Temp Pulse Resp BP Pulse Ox


 


 97.9 F   79   16   109/69   100 


 


 02/08/17 17:44  02/08/17 17:44  02/08/17 17:44  02/08/17 17:44  02/08/17 17:44

## 2017-08-23 NOTE — RADIOLOGY REPORT (SQ)
EXAM DESCRIPTION:  U/S OB TRANSVAGINAL W/O DOP



COMPLETED DATE/TIME:  2017 6:58 pm



REASON FOR STUDY:  5 weeks pregnant, LLQ pain



COMPARISON:  None.



TECHNIQUE:  Transvaginal static and realtime grayscale images acquired of the pelvis. Additional tamia
cted spectral and color Doppler images recorded. All images stored on PACs.

[facility]



LIMITATIONS:  None.



FINDINGS:  UTERUS: No visualized intrauterine pregnancy.

RIGHT ADNEXA: Normal ovary with normal vascular flow.

No adnexal free fluid.

No adnexal masses.

LEFT ADNEXA: Normal ovary with normal vascular flow.

No adnexal free fluid.

No adnexal masses.

FREE FLUID: Minimal, not significant.

OTHER: No other significant finding.



IMPRESSION:  NO VISUALIZED INTRA- OR EXTRAUTERINE PREGNANCY.

bHCG LEVEL TOO LOW TO EXPECT VISUALIZATION OF PREGNANCY.

ECTOPIC PREGNANCY CANNOT BE EXCLUDED.

FOLLOW-UP ULTRASOUND AND SERIAL BHCG LEVELS STRONGLY RECOMMENDED TO ACCURATELY ASSESS PREGNANCY STATU
S.



TECHNICAL DOCUMENTATION:  JOB ID:  [id number]

  [employer]- All Rights Reserved

## 2017-08-23 NOTE — ER DOCUMENT REPORT
ED GI/





- General


Chief Complaint: Flu Symptoms


Stated Complaint: FLU LIKE SYMPTOMS


Time Seen by Provider: 17 14:53


Notes: 


The patient is a 30-year-old female, , 5 weeks pregnant, presents with 2 

weeks of nausea, vomiting and 2 days of left lower quadrant abdominal pain.  

She is also noticing some vaginal spotting.  Denies diarrhea, constipation, 

dysuria, flank pain, hematuria, chest pain or shortness of breath.


TRAVEL OUTSIDE OF THE [place] IN LAST 30 DAYS: No





- Related Data


Allergies/Adverse Reactions: 


 





No Known Allergies Allergy (Verified 17 14:49)


 








Home Medications: 


 Current Home Medications





Pnv No.122/Iron/Folic Acid [Prenatal Multi Tablet] 1 each PO DAILY 17 [

History]











Past Medical History





- General


Information source: Patient





- Social History


Smoking Status: Unknown if Ever Smoked


Family History: Reviewed & Not Pertinent, DM





- Past Medical History


Cardiac Medical History: 


   Denies: Hx Coronary Artery Disease, Hx Heart Attack, Hx Hypertension


Pulmonary Medical History: 


   Denies: Hx Asthma, Hx Bronchitis, Hx COPD, Hx Pneumonia


Neurological Medical History: Denies: Hx Cerebrovascular Accident, Hx Seizures


Renal/ Medical History: Reports: Hx Ectopic Pregnancy.  Denies: Hx Peritoneal 

Dialysis


Musculoskeltal Medical History: Denies Hx Arthritis


Psychiatric Medical History: Reports: Hx Anxiety, Hx Depression


Past Surgical History: Reports: Hx Gynecologic Surgery





- Immunizations


Hx Diphtheria, Pertussis, Tetanus Vaccination: Yes





Review of Systems





- Review of Systems


Notes: 


REVIEW OF SYSTEMS:


CONSTITUTIONAL: -fevers, -chills


EENT: -eye pain, -difficulty swallowing, -nasal congestion


CARDIOVASCULAR:-chest pain, -syncope.


RESPIRATORY: -cough, -SOB


GASTROINTESTINAL: +abdominal pain, +nausea, +vomiting, -diarrhea


GENITOURINARY: -dysuria, -hematuria, +vaginal spotting


MUSCULOSKELETAL: -back pain, -neck pain


SKIN: -rash or skin lesions.


HEMATOLOGIC: -easy bruising or bleeding.


LYMPHATIC: -swollen, enlarged glands.


NEUROLOGICAL: -altered mental status or loss of consciousness, -headache, -

neurologic symptoms


PSYCHIATRIC: -anxiety, -depression.


ALL OTHER SYSTEMS REVIEWED AND NEGATIVE.





Physical Exam





- Vital signs


Vitals: 


 











Temp Pulse Resp BP Pulse Ox


 


 97.8 F   61   18   113/58 L  100 


 


 17 14:50  17 14:50  17 14:50  17 14:50  17 14:50














- Notes


Notes: 


PHYSICAL EXAMINATION:





GENERAL: Well-appearing, well-nourished and in no acute distress.





HEAD: Atraumatic, normocephalic.





EYES: Pupils equal round and reactive to light, extraocular movements intact, 

sclera anicteric, conjunctiva are normal.





ENT: nares patent, oropharynx clear without exudates.  Moist mucous membranes.





NECK: Normal range of motion, supple without lymphadenopathy





LUNGS: Breath sounds clear to auscultation bilaterally and equal.  No wheezes 

rales or rhonchi.





HEART: Regular rate and rhythm without murmurs





ABDOMEN: Soft, mild LLQ tenderness, normoactive bowel sounds.  No guarding, no 

rebound.  No masses appreciated.





EXTREMITIES: Normal range of motion, no pitting or edema.  No cyanosis.





NEUROLOGICAL: Cranial nerves grossly intact.  Normal speech, normal gait.  

Normal sensory and motor exams.





PSYCH: Normal mood, normal affect.





SKIN: Warm, Dry, normal turgor, no rashes or lesions noted.





Course





- Re-evaluation


Re-evalutation: 


Patient's beta hCG is 340 and her ultrasound does not show a intrauterine 

pregnancy.  Instructed her to return in 48 hours for repeat blood test and 

ultrasound or sooner if she has worsening abdominal pain.  Patient's other labs 

are unremarkable.  Will provide her with Diclegis for her nausea and vomiting.





- Vital Signs


Vital signs: 


 











Temp Pulse Resp BP Pulse Ox


 


 97.8 F   61   18   113/58 L  100 


 


 17 14:50  17 14:50  17 14:50  17 14:50  17 14:50














- Laboratory


Result Diagrams: 


 17 16:26





 17 16:26


Laboratory results interpreted by me: 


 











  17





  16:26


 


Potassium  3.4 L


 


BUN  5 L


 


Beta HCG, Quant  328.12 H














Discharge





- Discharge


Clinical Impression: 


Abdominal pain during pregnancy


Qualifiers:


 Trimester: first trimester Qualified Code(s): O26.891 - Other specified 

pregnancy related conditions, first trimester; R10.9 - Unspecified abdominal 

pain





Nausea and vomiting


Qualifiers:


 Vomiting type: unspecified Vomiting Intractability: non-intractable Qualified 

Code(s): R11.2 - Nausea with vomiting, unspecified





Condition: Stable


Disposition: HOME, SELF-CARE


Additional Instructions: 


Your beta hCG today is 328 and your ultrasound was unable to visualize an 

intrauterine fetus.  This may be due to the early gestational age or you may 

have a fetus outside of the uterus, which is dangerous. You must return in 48 

hours for a repeat blood test and ultrasound in 48 hours. If you begin to have 

worsening abdominal pain or any other concerns, return immediately to the 

emergency room.





PREGNANCY:





     You are pregnant.  Prenatal care is best started as early in pregnancy as 

possible.


     If you're unsure about continuing this pregnancy, you should discuss this 

with your physician or with counsellors at [facility].


     You should take only medications approved by your physician. Acetaminophen 

can safely be taken for minor pains.  As a rule, medication for chronic 

conditions such as asthma or seizures can safely be continued.  You should 

discuss with the physician every medicine you take.


     Any regular exercise program can be continued.  Talk to your physician, 

however, before engaging in competitive or demanding sports.


     Alcohol, smoking, and "street drugs" are dangerous to your baby. Cocaine 

is especially dangerous.  Don't use any illicit drugs!





REPEAT BLOOD TEST:


     At this time, it is uncertain if you have a viable pregnancy.  During the 

first three months of pregnancy, the pregnancy hormone produced from the 

placenta will steadily rise, usually doubling in value every 2 - 3 days.  In 

order to determine if your pregnancy is viable and likely be succesful, a 

repeat of this blood test for the pregnancy hormone is recommended in 2 - 3 

days.  An order for this test to be done as an outpatient is being provided.  

After you have this repeat test done, call your doctor or call us for the 

results.  If the value of the test is increasing as would be expected in a 

normal pregnancy, then your pregnancy is likely to be ok.  However, if the 

value of the test is declining, it will suggest something has happened with 

your pregnancy and it will not likely be a successful pregnancy.








FOLLOW-UP CARE:


If you have been referred to a physician for follow-up care, call the physician

s office for an appointment as you were instructed or within the next two days.

  If you experience worsening or a significant change in your symptoms (very 

heavy bleeding with large clots of blood, passage of tissue, more severe 

abdominal / pelvic pain or cramping, feeling faint or severe weakness, fever, 

etc.), notify the physician immediately or return to the Emergency Department 

at any time for re-evaluation.





OBSTETRIC-GYNECOLOGIC (OB-GYN) PHYSICIANS IN [place]:





[facility]


   [address] [place], [place]


   [phone]





[facility]


    [address], [place]


    [phone]





For active duty and  dependents diagnosed with a threatened  or 

miscarriage, you should follow up in the following manner:





      Standard patients who have a local civilian provider should follow 

up with that provider.





      Patients of the [facility] Practice [facility] should call your Team Nurse at 8:

00 am the following morning for further instructions.





     If you are neither a  Standard patient nor a patient of the Family 

Practice Clinic, you should follow up at the [facility] ([facility])

.  Patients already enrolled in the [facility],  Prime patients not 

assigned to the Family Practice Clinic, and Active Duty patients not assigned 

to Family Practice Clinic should report to the [facility] at 8:00 am the next 

morning that the [facility] is open and then you will be seen in the OB 

Clinic at 11:00 am.


Prescriptions: 


Doxylamine/Pyridoxine HCl [Diclegis Dr 10-10 mg Tablet] 1 each PO Q8H PRN #30 

tablet.


 PRN Reason: 


Referrals: 


JULISA KIRK MD [ACTIVE STAFF] - Follow up as needed

## 2017-10-08 NOTE — RADIOLOGY REPORT (SQ)
EXAM DESCRIPTION:  U/S 1TRIMESTER/1GEST W/DOPPLER



COMPLETED DATE/TIME:  10/8/2017 1:07 pm



REASON FOR STUDY:  left pelvic pain, 12 weeks pregnant



COMPARISON:  None.



TECHNIQUE:  Transvaginal static and realtime grayscale images acquired of the pelvis. Additional tamia
cted spectral and color Doppler images recorded. All images stored on PACs.

bHCG: Not available.



LIMITATIONS:  None.



FINDINGS:  FETUS: Living intrauterine pregnancy.

EGA: 11 weeks 0 days

VINNIE: 4/29/2018

FHR: 168  beats per minute.

SUBCHORIONIC BLEED: No.

SIZE OF BLEED: Not applicable.

UTERUS: No masses. No anomalies.

CERVICAL LENGTH: 3.2 cm   Closed.

RIGHT ADNEXA: Normal ovary with normal vascular flow.

No adnexal free fluid.

No adnexal masses.

LEFT ADNEXA: Normal ovary with normal vascular flow.

No adnexal free fluid.

No adnexal masses.

FREE FLUID: None.

OTHER: No other significant finding.



IMPRESSION:  LIVING INTRAUTERINE PREGNANCY.

EGA 11 WEEKS 0 DAYS.

Trimester of pregnancy:  First - 0 to 13 weeks.



TECHNICAL DOCUMENTATION:  JOB ID:  [id number]

 2011 [employer]- All Rights Reserved

## 2017-10-08 NOTE — ER DOCUMENT REPORT
ED GI/





- General


Chief Complaint: Abdominal Pain


Stated Complaint: LEFT SIDE ABDOMINAL PAIN


Time Seen by Provider: 10/08/17 10:33


Mode of Arrival: Ambulatory


Notes: 





31 yo female 11 weeks pregnant c/o intermittent (few seconds) sharp left pelvic 

pain that woke her up this am (for several days). No bleeding. She wants to 

make sure everything is OK, had bleeding early in pregnancy with subchorionic 

bleed. She has been waiting 6 years for this pregnancy. Has had lots of nausea 

during this first trimester.


TRAVEL OUTSIDE OF THE [place] IN LAST 30 DAYS: No





- Related Data


Allergies/Adverse Reactions: 


 





No Known Allergies Allergy (Verified 10/08/17 10:06)


 











Past Medical History





- General


Information source: Patient





- Social History


Smoking Status: Never Smoker


Frequency of alcohol use: None


Drug Abuse: None


Lives with: Family


Family History: Reviewed & Not Pertinent, DM





- Medical History


Notes: 





 I miscarriage, I ectopic, no fallopian tube loss


Renal/ Medical History: Reports: Hx Ectopic Pregnancy.  Denies: Hx Peritoneal 

Dialysis


Psychiatric Medical History: Reports: Hx Anxiety, Hx Depression


Past Surgical History: Reports: Hx Gynecologic Surgery





- Immunizations


Hx Diphtheria, Pertussis, Tetanus Vaccination: Yes





Review of Systems





- Review of Systems


Constitutional: No symptoms reported


EENT: No symptoms reported


Cardiovascular: No symptoms reported


Respiratory: No symptoms reported


Gastrointestinal: No symptoms reported


Genitourinary: No symptoms reported


Female Genitourinary: See HPI


Musculoskeletal: No symptoms reported


Skin: No symptoms reported


Hematologic/Lymphatic: No symptoms reported


Neurological/Psychological: No symptoms reported





Physical Exam





- Vital signs


Vitals: 


 











Temp Pulse Resp BP Pulse Ox


 


 97.7 F   79   16   109/66   100 


 


 10/08/17 10:07  10/08/17 10:07  10/08/17 10:07  10/08/17 10:07  10/08/17 10:07











Interpretation: Normal





- General


General appearance: Appears well, Alert


In distress: None





- HEENT


Head: Normocephalic, Atraumatic


Eyes: Normal


Pupils: PERRL





- Respiratory


Respiratory status: No respiratory distress


Chest status: Nontender


Breath sounds: Normal


Chest palpation: Normal





- Cardiovascular


Rhythm: Regular


Heart sounds: Normal auscultation


Murmur: No





- Abdominal


Inspection: Normal


Distension: No distension


Bowel sounds: Normal


Tenderness: Nontender


Organomegaly: No organomegaly


Notes: 





fht 163





- Back


Back: Normal, Nontender





- Extremities


General upper extremity: Normal inspection, Nontender, Normal color, Normal ROM

, Normal temperature


General lower extremity: Normal inspection, Nontender, Normal color, Normal ROM

, Normal temperature, Normal weight bearing.  No: Heaven's sign





- Neurological


Neuro grossly intact: Yes


Cognition: Normal


Orientation: AAOx4


[place] Coma Scale Eye Opening: Spontaneous


[place] Coma Scale Verbal: Oriented


[place] Coma Scale Motor: Obeys Commands


[place] Coma Scale Total: 15


Speech: Normal


Motor strength normal: LUE, RUE, LLE, RLE


Sensory: Normal





- Psychological


Associated symptoms: Normal affect, Normal mood





- Skin


Skin Temperature: Warm


Skin Moisture: Dry


Skin Color: Normal





Course





- Re-evaluation


Re-evalutation: 





10/08/17 10:43


Fetal heart tones are 163, urine is dilute and sent to lab for UA.


10/08/17 10:43





10/08/17 13:17


Ultrasound shows a viable 11 week pregnancy with good bilateral ovarian 

vascular flow.





- Vital Signs


Vital signs: 


 











Temp Pulse Resp BP Pulse Ox


 


 98.4 F   62   16   101/58 L  98 


 


 10/08/17 13:48  10/08/17 13:48  10/08/17 13:48  10/08/17 13:48  10/08/17 13:48














Discharge





- Discharge


Clinical Impression: 


 Viable 11 week IUP, Left pelvic pain





Condition: Good


Disposition: HOME, SELF-CARE


Instructions:  Acetaminophen, Pelvic Pain ([facility])


Additional Instructions: 


to er any concerns, worsening pain or new symptoms tonight


see the obgyn doctor tomorrow


tylenol for discomfort





Please complete the patient satisfaction survey if you get one, and return it.. 

If you do not receive a survey,  then you can go to the [facility] website, [email or web address] 

and place your comments about your very good care. Thank you very much. It was 

a pleasure being your medical provider today.


Referrals: 


JULISA KIRK MD [ACTIVE STAFF] - Follow up tomorrow

## 2017-11-24 NOTE — RADIOLOGY REPORT (SQ)
EXAM DESCRIPTION:  CHEST SINGLE VIEW



COMPLETED DATE/TIME:  11/24/2017 4:57 pm



REASON FOR STUDY:  Left rib pain, 18 weeks pregnant, one PA view



COMPARISON:  Two-view chest 1/5/2017



EXAM PARAMETERS:  NUMBER OF VIEWS: One view.

TECHNIQUE: Single frontal radiographic view of the chest acquired.

RADIATION DOSE: NA

LIMITATIONS: None.



FINDINGS:  LUNGS AND PLEURA: No opacities, masses or pneumothorax. No pleural effusion.

MEDIASTINUM AND HILAR STRUCTURES: No masses.  Contour normal.

HEART AND VASCULAR STRUCTURES: Heart normal in size.  Normal vasculature.

BONES: No acute findings.

HARDWARE: None in the chest.

OTHER: No other significant finding.



IMPRESSION:  NO ACUTE RADIOGRAPHIC FINDING IN THE CHEST.



TECHNICAL DOCUMENTATION:  JOB ID:  [id number]

 2011 [employer]- All Rights Reserved

## 2017-11-24 NOTE — ER DOCUMENT REPORT
ED General





- General


Chief Complaint: Abdominal Pain


Stated Complaint: UPPER ABDOMINAL PAIN


Time Seen by Provider: 11/24/17 16:36


Notes: 





Patient noted some tenderness and pain and apparent enlargement of her lower 

left rib cage over the past couple of weeks.  She has had no injury to that 

area.  Today, she coughed and felt it pop in that area and it is much more 

painful.  She is not short of breath, however.  It does hurt to take a deep 

breath.  No fevers.





Patient is 18 weeks pregnant.  Former smoker.  No history of any lung diseases.


TRAVEL OUTSIDE OF THE [place] IN LAST 30 DAYS: No





- Related Data


Allergies/Adverse Reactions: 


 





No Known Allergies Allergy (Verified 11/24/17 15:51)


 











Past Medical History





- Social History


Smoking Status: Former Smoker


Frequency of alcohol use: None


Drug Abuse: None


Family History: Reviewed & Not Pertinent, DM


Patient has suicidal ideation: No


Patient has homicidal ideation: No





- Past Medical History


Cardiac Medical History: 


   Denies: Hx Coronary Artery Disease, Hx Heart Attack, Hx Hypertension


Pulmonary Medical History: 


   Denies: Hx Asthma, Hx Bronchitis, Hx COPD, Hx Pneumonia


Renal/ Medical History: Reports: Hx Ectopic Pregnancy


Musculoskeltal Medical History: Denies Hx Arthritis


Psychiatric Medical History: Reports: Hx Anxiety, Hx Depression


Past Surgical History: Reports: Hx Gynecologic Surgery





- Immunizations


Hx Diphtheria, Pertussis, Tetanus Vaccination: Yes





Review of Systems





- Review of Systems


Constitutional: denies: Fever


Cardiovascular: See HPI


Respiratory: See HPI, Cough.  denies: Hemoptysis, Short of breath


Gastrointestinal: denies: Abdominal pain, Diarrhea, Nausea, Vomiting, 

Constipation


Genitourinary: No symptoms reported


Female Genitourinary: No symptoms reported, Pregnant


Skin: No symptoms reported.  denies: Rash





Physical Exam





- Vital signs


Vitals: 


 











Temp Pulse Resp BP Pulse Ox


 


 97.7 F   66   20   113/57 L  100 


 


 11/24/17 15:51  11/24/17 15:51  11/24/17 15:51  11/24/17 15:51  11/24/17 15:51











Interpretation: Normal





- Notes


Notes: 





PHYSICAL EXAMINATION:





GENERAL: Well-appearing, in no acute distress.  Anxious.  Vital signs are 

normal.





HEAD: Atraumatic, normocephalic.





NECK: Normal range of motion, supple.





LUNGS: Breath sounds clear and equal bilaterally.  Patient is tender along the 

costochondral margin of the left lower anterior chest but no abdominal 

tenderness.





HEART: Regular rate and rhythm without murmurs.





ABDOMEN: Soft, nontender.  No guarding or rebound.





BACK:  No tenderness throughout entire back.





EXTREMITIES: Normal range of motion without pain.  No pain or swelling or 

anything suggesting blood clots.





NEUROLOGICAL:  Normal speech, normal gait.  Normal sensory, motor, and reflex 

exams.  Awake, alert, and oriented x3.  Cranial nerves normal.





PSYCH: Normal mood, normal affect.





SKIN: Warm, dry, no rashes.





Course





- Vital Signs


Vital signs: 


 











Temp Pulse Resp BP Pulse Ox


 


 97.7 F   66   20   113/57 L  100 


 


 11/24/17 15:51  11/24/17 15:51  11/24/17 15:51  11/24/17 15:51  11/24/17 15:51














- Diagnostic Test


Radiology results interpreted by me: 





11/24/17 20:16


Single AP view of the chest is normal.





Discharge





- Discharge


Clinical Impression: 


 Left rib cartilage pain, Chest wall pain





Condition: Stable


Disposition: HOME, SELF-CARE


Additional Instructions: 


CHEST WALL PAIN:


     Your chest pain may be coming from the chest wall. This is often caused by 

straining the muscles or joints in the chest during physical activity, direct 

trauma, coughing, or vigorous vomiting.  Persons with arthritis are especially 

prone to this type of pain, due to inflammation of the cartilage joints near 

the breast bone. Occasionally, no cause can be found.


     Rest from strenuous physical activity.  This kind of chest pain is usually 

made worse by movement of the chest.  Depending on the symptoms, we may 

prescribe medicine for pain, muscle relaxation, and antiinflammatory effects.


     If the pain is new, and seems to be due to muscle strain, cold packs can 

help.  Otherwise, apply gentle warmth to the painful area for 15 minutes every 

hour or two.


     You should call contact the doctor immediately if things change. Further 

evaluation is needed if you develop a fever or cough, if the nature of the pain 

changes, or if you become short of breath.





Her chest x-ray was normal.  The pain you are having appears to be from the 

cartilage at the end of your ribs.  You may have  some of the 

cartilage.  Tylenol for pain and apply heat to the area for pain relief.





USE OF ACETAMINOPHEN (Tylenol):


     Acetaminophen may be taken for pain relief or fever control. It's much 

safer than aspirin, offering a wider range of "safe" dosages.  It is safe 

during pregnancy.  Some brand names are Tylenol, Panadol, Datril, Anacin 3, 

Tempra, and Liquiprin. Acetaminophen can be repeated every four hours.  The 

following are maximum recommended dosages:





WEIGHT         Dose             Drops                  Elixir        Chewable(

80mg)


(LBS.)                            drprs=droppers    tsp=teaspoon


>89 pounds or adults          650 mg to 900 mg





Acetaminophen can be repeated every four hours.  Maximum dose not to exceed 

4000 mg a day.





   These maximum recommended dosages are slightly higher than the dosages 

written on the product container, but these dosages are very safe and below the 

toxic dosage for acetaminophen.





FOLLOW-UP CARE:


If you have been referred to a physician for follow-up care, call the physician

s office for an appointment as you were instructed or within the next two days.

  If you experience worsening or a significant change in your symptoms, notify 

the physician immediately or return to the Emergency Department at any time for 

re-evaluation.


Referrals: 


CHICA SHAHID MD [Primary Care Provider] - Follow up as needed

## 2017-12-20 NOTE — ER DOCUMENT REPORT
ED Respiratory Problem





- General


Chief Complaint: Cough


Stated Complaint: COUGH,BODY ACHES


Time Seen by Provider: 12/20/17 17:42


Mode of Arrival: Ambulatory


Information source: Patient, Relative


Notes: 





Patient is a 31-year-old female comes emergency room with a 24 onset of fever 

cough congestion runny nose.  Patient is also 22 weeks gestation.  She 

complains of generalized body aches and pains fever but unmeasured.   

states that she did break out in a cold clammy sweat last night and she felt 

very hot.  Patient states she has been congested with a little sore throat as 

well.  She has coughing more when she lays down.  [facility].  She denies smoking.  Denies any other 

medical problems.


TRAVEL OUTSIDE OF THE [place] IN LAST 30 DAYS: No





- HPI


Patient complains to provider of: Cough


Onset: Yesterday


Duration: Continuous, Worse/persistent


Initiating Event: URI


Quality of pain: Achy


Pain Level: 2


Context: Pregnant


Short of Breath: Mild


Cough: Nonproductive


Sputum amount: None


Associated symptoms: Chills, Congestion, Cough, Earache, Runny nose, Sore Throat

, Sweaty


Similar symptoms previously: Yes


Recently seen / treated by doctor: No





- Related Data


Allergies/Adverse Reactions: 


 





No Known Allergies Allergy (Verified 12/20/17 17:29)


 











Past Medical History





- General


Information source: Patient


Last Menstrual Period: 30 weeks ago





- Social History


Smoking Status: Former Smoker


Cigarette use (# per day): No


Chew tobacco use (# tins/day): No


Smoking Education Provided: No


Frequency of alcohol use: None


Drug Abuse: None


Occupation: Works at a restaurant


Lives with: Spouse/Significant other


Family History: Reviewed & Not Pertinent, DM





- Past Medical History


Cardiac Medical History: 


   Denies: Hx Coronary Artery Disease, Hx Heart Attack, Hx Hypertension


Pulmonary Medical History: 


   Denies: Hx Asthma, Hx Bronchitis, Hx COPD, Hx Pneumonia


Neurological Medical History: Denies: Hx Cerebrovascular Accident, Hx Seizures


Renal/ Medical History: Reports: Hx Ectopic Pregnancy.  Denies: Hx Peritoneal 

Dialysis


Musculoskeltal Medical History: Denies Hx Arthritis


Psychiatric Medical History: Reports: Hx Anxiety, Hx Depression


Past Surgical History: Reports: Hx Gynecologic Surgery





- Immunizations


Hx Diphtheria, Pertussis, Tetanus Vaccination: Yes





Review of Systems





- Review of Systems


Constitutional: Fever, Malaise, Weakness


EENT: Ear pain, Nose congestion, Nose discharge, Throat pain, Difficulty 

swallowing


Cardiovascular: No symptoms reported


Respiratory: See HPI, Cough


Gastrointestinal: No symptoms reported


Genitourinary: No symptoms reported


Female Genitourinary: No symptoms reported


Musculoskeletal: No symptoms reported


Skin: No symptoms reported


Hematologic/Lymphatic: No symptoms reported


Neurological/Psychological: No symptoms reported


-: Yes All other systems reviewed and negative





Physical Exam





- Vital signs


Vitals: 


 











Temp Pulse Resp BP Pulse Ox


 


 98.5 F   85   16   111/52 L  96 


 


 12/20/17 17:38  12/20/17 17:38  12/20/17 17:38  12/20/17 17:38  12/20/17 17:38











Interpretation: Normal





- General


General appearance: Alert - Ill-appearing


In distress: Moderate





- HEENT


Head: Normocephalic, Atraumatic


Eyes: Normal


Ears: Normal


External canal: Swollen, Other - Examination of the ears bilaterally show 

patient has cerumen in the external canals but it is minimal and both TMs are 

visualized completely.  Patient complains of right ear pain examination of the 

right ear showed the TM bulging very dull with fluid behind it.  It looks like 

a type of material.  Rest of the exam showed dulling of the TM.  Erythematous 

area surrounding the TM.  There was more normal but did have a little bulge 

with no air-fluid level.


Tympanic membrane: Bulging, Purulent effusion


Nasal: Purulent discharge


Mouth/Lips: Normal


Mucous membranes: Moist


Pharynx: Normal


Neck: Normal, Anterior cervical chain, Lymphadenopathy.  No: Posterior cervical 

chain, Brudzinski, Carotid bruit, Kernig's, Meningismus, Neck mass, Shotty nodes

, Subcutaneous emphysema, Supple, Thyroid nodule, Thyromegally, Other





- Respiratory


Respiratory status: No respiratory distress


Chest status: Nontender


Breath sounds: Normal.  No: Decreased air movement, Nonproductive cough, 

Productive cough, Rales, Rhonchi, Stridor, Wheezing, Other


Chest palpation: Normal





- Cardiovascular


Rhythm: Regular


Heart sounds: Normal auscultation


Murmur: No





- Neurological


Neuro grossly intact: Yes


Cognition: Normal


Orientation: AAOx4


[place] Coma Scale Eye Opening: Spontaneous


[place] Coma Scale Verbal: Oriented


[place] Coma Scale Motor: Obeys Commands


[place] Coma Scale Total: 15


Speech: Normal





Course





- Re-evaluation


Re-evalutation: 





12/20/17 19:11


Patient's influenza came back negative.  Given the fact that the right ear was 

a purulent kind of a fluid behind the TM treat patient with amoxicillin she 

will take Benadryl over-the-counter for the congestion portion.  She will follow

-up with her OB/GYN tomorrow or the next day.  She has been informed just to 

take Tylenol only for fever did not cover up and swept this out she needs to 

stay cool and patient expressed understanding.  She is also with her fianc or 

boyfriend and he also understands that she is not to sweat this out covering up.





- Vital Signs


Vital signs: 


 











Temp Pulse Resp BP Pulse Ox


 


 98.5 F   85   16   111/52 L  96 


 


 12/20/17 17:38  12/20/17 17:38  12/20/17 17:38  12/20/17 17:38  12/20/17 17:38














Discharge





- Discharge


Clinical Impression: 


 Acute otitis media with effusion of right ear





Condition: Good


Disposition: HOME, SELF-CARE


Instructions:  Otitis Media ([facility])


Additional Instructions: 


Home and rest.  Medications prescribed.  He may take Benadryl 25 or 50 mg every 

6 hours for the congestion.  Use nasal saline 3 4 times a day to keep the nose 

moist and secretions thin.  I would contact her OB/GYN tomorrow to inform them 

of your situation with the cold.  Should you have any concerns or problems 

return to ER.  Remember only take Tylenol for the fever and do not wrap herself 

up with multiple layers of close try and sweat this out is not good for you or 

the baby.


Prescriptions: 


Amoxicillin 1 tab PO TID #30 tab